# Patient Record
Sex: FEMALE | Race: WHITE | NOT HISPANIC OR LATINO | Employment: OTHER | URBAN - NONMETROPOLITAN AREA
[De-identification: names, ages, dates, MRNs, and addresses within clinical notes are randomized per-mention and may not be internally consistent; named-entity substitution may affect disease eponyms.]

---

## 2017-01-24 ENCOUNTER — OFFICE VISIT (OUTPATIENT)
Dept: NEUROSURGERY | Facility: CLINIC | Age: 52
End: 2017-01-24

## 2017-01-24 DIAGNOSIS — M51.36 L4-L5 DISC BULGE: Primary | ICD-10-CM

## 2017-01-24 DIAGNOSIS — M51.36 DDD (DEGENERATIVE DISC DISEASE), LUMBAR: ICD-10-CM

## 2017-01-24 PROCEDURE — 99203 OFFICE O/P NEW LOW 30 MIN: CPT | Performed by: NEUROLOGICAL SURGERY

## 2017-01-24 RX ORDER — ATORVASTATIN CALCIUM 20 MG/1
TABLET, FILM COATED ORAL
Refills: 2 | COMMUNITY
Start: 2016-12-22 | End: 2018-04-05

## 2017-01-24 RX ORDER — GABAPENTIN 100 MG/1
100 CAPSULE ORAL DAILY
COMMUNITY
End: 2018-04-05

## 2017-01-24 RX ORDER — CITALOPRAM 10 MG/1
TABLET ORAL
Refills: 11 | COMMUNITY
Start: 2016-12-18 | End: 2019-01-09 | Stop reason: ALTCHOICE

## 2017-01-24 RX ORDER — IBUPROFEN 200 MG
200 TABLET ORAL EVERY 6 HOURS PRN
COMMUNITY
End: 2017-02-09 | Stop reason: CLARIF

## 2017-01-24 NOTE — LETTER
January 24, 2017     LIAM Mtz  [address]  [place] [address]    Patient: Karin Sun   YOB: 1965   Date of Visit: 1/24/2017       Dear LIAM Pickett:    Thank you for referring Karin Sun to me for evaluation. Below is a copy of my consult note.    If you have questions, please do not hesitate to call me. I look forward to following Karin along with you.         Sincerely,        Addi Graham MD        CC: No Recipients    Subjective   Patient ID: Karin Sun is a 51 y.o. female is being seen for consultation today at the request of LIAM Mtz  Chief Complaint: Back and right leg pain pain    History of Present Illness: The patient is a 51-year-old woman with complaint of pain in her lower back for many years, and back and right leg discomfort for the past 2 years.  She gets numbness of the lateral 3 toes on her right foot if she sits too long, such as when driving, or when she walks for longer periods.  Standing or sitting have to be shifted in order to get relief.  Many years ago she had chiropractic therapy.  Her chiropractor first suggested fibromyalgia, and she has been treated in the past by rheumatology for this diagnosis.  She hasn't had any formal physical therapy done yet.  She is disabled by her chronic pain problems.  Review of Radiographic Studies:  [ Lumbar MRI scan on 12/30/16 was reviewed showing a central mild disc bulge at L4 5, all other findings are normal.  There is no evidence of a nerve root compression as the cause of the right hip and leg pain or the right lateral 3 toes numbness.]    The following portions of the patient's history were reviewed, updated as appropriate and approved: allergies, current medications, past family history, past medical history, past social history, past surgical history, review of systems and problem list.  Review of Systems   Constitutional: Positive for activity change. Negative for appetite change,  chills, diaphoresis, fatigue, fever and unexpected weight change.   HENT: Negative for congestion, dental problem, drooling, ear discharge, ear pain, facial swelling, hearing loss, mouth sores, nosebleeds, postnasal drip, rhinorrhea, sinus pressure, sneezing, sore throat, tinnitus, trouble swallowing and voice change.    Eyes: Positive for photophobia. Negative for pain, discharge, redness, itching and visual disturbance.   Respiratory: Negative for apnea, cough, choking, chest tightness, shortness of breath, wheezing and stridor.    Cardiovascular: Negative for chest pain, palpitations and leg swelling.   Gastrointestinal: Negative for abdominal distention, abdominal pain, anal bleeding, blood in stool, constipation, diarrhea, nausea, rectal pain and vomiting.   Endocrine: Negative for cold intolerance, heat intolerance, polydipsia, polyphagia and polyuria.   Genitourinary: Negative for decreased urine volume, difficulty urinating, dysuria, enuresis, flank pain, frequency, genital sores, hematuria and urgency.   Musculoskeletal: Positive for arthralgias, back pain, myalgias, neck pain and neck stiffness. Negative for gait problem and joint swelling.   Skin: Negative for color change, pallor, rash and wound.   Allergic/Immunologic: Positive for environmental allergies. Negative for food allergies and immunocompromised state.   Neurological: Positive for headaches. Negative for dizziness, tremors, seizures, syncope, facial asymmetry, speech difficulty, weakness, light-headedness and numbness.   Hematological: Negative for adenopathy. Does not bruise/bleed easily.   Psychiatric/Behavioral: Positive for decreased concentration. Negative for agitation, behavioral problems, confusion, dysphoric mood, hallucinations, self-injury, sleep disturbance and suicidal ideas. The patient is nervous/anxious. The patient is not hyperactive.    All other systems reviewed and are negative.      Objective     NEUROLOGICAL EXAMINATION:       MENTAL STATUS:  Alert and oriented.  Speech intact.  Recent and remote memory intact.      CRANIAL NERVES:  Cranial nerve II:  Visual fields are full to confrontation.  Cranial nerves III, IV and VI:  PERRLADC.  Extraocular movements are intact.  Nystagmus is not present.  Cranial nerve V:  Facial sensation is intact to light touch.  Cranial nerve VII:  Muscles of facial expression reveal no asymmetry.  Cranial nerve VIII:  Hearing is intact to finger rub bilaterally.  Cranial nerves IX and X:  Palate elevates symmetrically.  Cranial nerve XI:  Shoulder shrug is intact.  Cranial nerve XII:  Tongue is midline without evidence of atrophy or fasciculation.    MUSCULOSKELETAL:  SLR  mildly positive on the right, negative on the left. Francisco's test negative.    MOTOR:  Deltoid, biceps, triceps, and  strength intact.  No hand atrophy.  Hip flexion, knee extension, ankle dorsiflexion and plantar flexion intact. No tremor, spasticity, ataxia, or dysmetria.    SENSATION: Intact to touch upper and lower extremities.  Position sense intact.    REFLEXES:  DTR [itact and symmetrical in upper and lower extremities. Negative Babinski bilaterally    Assessment    Chronic back and right leg pain.  Mild disc bulge L4 5.  No evidence of lumbar stenosis or herniated disc or nerve root compression.       Plan    Physical therapy to try to assist with treatment of the chronic right hip pain by formal designated exercise management.       Addi Graahm MD

## 2017-01-24 NOTE — PROGRESS NOTES
Subjective   Patient ID: Karin Sun is a 51 y.o. female is being seen for consultation today at the request of LIAM Mtz  Chief Complaint: Back and right leg pain pain    History of Present Illness: The patient is a 51-year-old woman with complaint of pain in her lower back for many years, and back and right leg discomfort for the past 2 years.  She gets numbness of the lateral 3 toes on her right foot if she sits too long, such as when driving, or when she walks for longer periods.  Standing or sitting have to be shifted in order to get relief.  Many years ago she had chiropractic therapy.  Her chiropractor first suggested fibromyalgia, and she has been treated in the past by rheumatology for this diagnosis.  She hasn't had any formal physical therapy done yet.  She is disabled by her chronic pain problems.  Review of Radiographic Studies:  [ Lumbar MRI scan on 12/30/16 was reviewed showing a central mild disc bulge at L4 5, all other findings are normal.  There is no evidence of a nerve root compression as the cause of the right hip and leg pain or the right lateral 3 toes numbness.]    The following portions of the patient's history were reviewed, updated as appropriate and approved: allergies, current medications, past family history, past medical history, past social history, past surgical history, review of systems and problem list.  Review of Systems   Constitutional: Positive for activity change. Negative for appetite change, chills, diaphoresis, fatigue, fever and unexpected weight change.   HENT: Negative for congestion, dental problem, drooling, ear discharge, ear pain, facial swelling, hearing loss, mouth sores, nosebleeds, postnasal drip, rhinorrhea, sinus pressure, sneezing, sore throat, tinnitus, trouble swallowing and voice change.    Eyes: Positive for photophobia. Negative for pain, discharge, redness, itching and visual disturbance.   Respiratory: Negative for apnea, cough,  choking, chest tightness, shortness of breath, wheezing and stridor.    Cardiovascular: Negative for chest pain, palpitations and leg swelling.   Gastrointestinal: Negative for abdominal distention, abdominal pain, anal bleeding, blood in stool, constipation, diarrhea, nausea, rectal pain and vomiting.   Endocrine: Negative for cold intolerance, heat intolerance, polydipsia, polyphagia and polyuria.   Genitourinary: Negative for decreased urine volume, difficulty urinating, dysuria, enuresis, flank pain, frequency, genital sores, hematuria and urgency.   Musculoskeletal: Positive for arthralgias, back pain, myalgias, neck pain and neck stiffness. Negative for gait problem and joint swelling.   Skin: Negative for color change, pallor, rash and wound.   Allergic/Immunologic: Positive for environmental allergies. Negative for food allergies and immunocompromised state.   Neurological: Positive for headaches. Negative for dizziness, tremors, seizures, syncope, facial asymmetry, speech difficulty, weakness, light-headedness and numbness.   Hematological: Negative for adenopathy. Does not bruise/bleed easily.   Psychiatric/Behavioral: Positive for decreased concentration. Negative for agitation, behavioral problems, confusion, dysphoric mood, hallucinations, self-injury, sleep disturbance and suicidal ideas. The patient is nervous/anxious. The patient is not hyperactive.    All other systems reviewed and are negative.      Objective     NEUROLOGICAL EXAMINATION:      MENTAL STATUS:  Alert and oriented.  Speech intact.  Recent and remote memory intact.      CRANIAL NERVES:  Cranial nerve II:  Visual fields are full to confrontation.  Cranial nerves III, IV and VI:  PERRLADC.  Extraocular movements are intact.  Nystagmus is not present.  Cranial nerve V:  Facial sensation is intact to light touch.  Cranial nerve VII:  Muscles of facial expression reveal no asymmetry.  Cranial nerve VIII:  Hearing is intact to finger rub  bilaterally.  Cranial nerves IX and X:  Palate elevates symmetrically.  Cranial nerve XI:  Shoulder shrug is intact.  Cranial nerve XII:  Tongue is midline without evidence of atrophy or fasciculation.    MUSCULOSKELETAL:  SLR  mildly positive on the right, negative on the left. Francisco's test negative.    MOTOR:  Deltoid, biceps, triceps, and  strength intact.  No hand atrophy.  Hip flexion, knee extension, ankle dorsiflexion and plantar flexion intact. No tremor, spasticity, ataxia, or dysmetria.    SENSATION: Intact to touch upper and lower extremities.  Position sense intact.    REFLEXES:  DTR [itact and symmetrical in upper and lower extremities. Negative Babinski bilaterally    Assessment    Chronic back and right leg pain.  Mild disc bulge L4 5.  No evidence of lumbar stenosis or herniated disc or nerve root compression.       Plan    Physical therapy to try to assist with treatment of the chronic right hip pain by formal designated exercise management.       Addi Graham MD

## 2017-01-24 NOTE — MR AVS SNAPSHOT
Karin Sun   2017 10:00 AM   Office Visit    Dept Phone:  [phone]   Encounter #:  [id number]    Provider:  Addi Graham MD   Department:  [facility]                Your Full Care Plan              Your Updated Medication List          This list is accurate as of: 17 10:34 AM.  Always use your most recent med list.                atorvastatin 20 MG tablet   Commonly known as:  LIPITOR       citalopram 10 MG tablet   Commonly known as:  CeleXA       gabapentin 100 MG capsule   Commonly known as:  NEURONTIN       ibuprofen 200 MG tablet   Commonly known as:  ADVIL,MOTRIN               We Performed the Following     Ambulatory Referral to Physical Therapy Evaluate and treat, Neuro       You Were Diagnosed With        Codes Comments    L4-L5 disc bulge    -  Primary ICD-10-CM: M51.26  ICD-9-CM: 722.10     DDD (degenerative disc disease), lumbar     ICD-10-CM: M51.36  ICD-9-CM: 722.52       Instructions    Back Exercises  If you have pain in your back, do these exercises 2-3 times each day or as told by your doctor. When the pain goes away, do the exercises once each day, but repeat the steps more times for each exercise (do more repetitions). If you do not have pain in your back, do these exercises once each day or as told by your doctor.  EXERCISES  Single Knee to Chest  Do these steps 3-5 times in a row for each le. Lie on your back on a firm bed or the floor with your legs stretched out.  2. Bring one knee to your chest.  3. Hold your knee to your chest by grabbing your knee or thigh.  4. Pull on your knee until you feel a gentle stretch in your lower back.  5. Keep doing the stretch for 10-30 seconds.  6. Slowly let go of your leg and straighten it.  Pelvic Tilt  Do these steps 5-10 times in a row:  1. Lie on your back on a firm bed or the floor with your legs stretched out.  2. Bend your knees so they point up to the  ceiling. Your feet should be flat on the floor.  3. Tighten your lower belly (abdomen) muscles to press your lower back against the floor. This will make your tailbone point up to the ceiling instead of pointing down to your feet or the floor.  4. Stay in this position for 5-10 seconds while you gently tighten your muscles and breathe evenly.  Cat-Cow  Do these steps until your lower back bends more easily:  1. Get on your hands and knees on a firm surface. Keep your hands under your shoulders, and keep your knees under your hips. You may put padding under your knees.  2. Let your head hang down, and make your tailbone point down to the floor so your lower back is round like the back of a cat.  3. Stay in this position for 5 seconds.  4. Slowly lift your head and make your tailbone point up to the ceiling so your back hangs low (sags) like the back of a cow.  5. Stay in this position for 5 seconds.  Press-Ups  Do these steps 5-10 times in a row:  1. Lie on your belly (face-down) on the floor.  2. Place your hands near your head, about shoulder-width apart.  3. While you keep your back relaxed and keep your hips on the floor, slowly straighten your arms to raise the top half of your body and lift your shoulders. Do not use your back muscles. To make yourself more comfortable, you may change where you place your hands.  4. Stay in this position for 5 seconds.  5. Slowly return to lying flat on the floor.  Bridges  Do these steps 10 times in a row:  1. Lie on your back on a firm surface.  2. Bend your knees so they point up to the ceiling. Your feet should be flat on the floor.  3. Tighten your butt muscles and lift your butt off of the floor until your waist is almost as high as your knees. If you do not feel the muscles working in your butt and the back of your thighs, slide your feet 1-2 inches farther away from your butt.  4. Stay in this position for 3-5 seconds.  5. Slowly lower your butt to the floor, and let  your butt muscles relax.  If this exercise is too easy, try doing it with your arms crossed over your chest.  Belly Crunches  Do these steps 5-10 times in a row:  1. Lie on your back on a firm bed or the floor with your legs stretched out.  2. Bend your knees so they point up to the ceiling. Your feet should be flat on the floor.  3. Cross your arms over your chest.  4. Tip your chin a little bit toward your chest but do not bend your neck.  5. Tighten your belly muscles and slowly raise your chest just enough to lift your shoulder blades a tiny bit off of the floor.  6. Slowly lower your chest and your head to the floor.  Back Lifts  Do these steps 5-10 times in a row:  1. Lie on your belly (face-down) with your arms at your sides, and rest your forehead on the floor.  2. Tighten the muscles in your legs and your butt.  3. Slowly lift your chest off of the floor while you keep your hips on the floor. Keep the back of your head in line with the curve in your back. Look at the floor while you do this.  4. Stay in this position for 3-5 seconds.  5. Slowly lower your chest and your face to the floor.  GET HELP IF:  · Your back pain gets a lot worse when you do an exercise.  · Your back pain does not lessen 2 hours after you exercise.  If you have any of these problems, stop doing the exercises. Do not do them again unless your doctor says it is okay.  GET HELP RIGHT AWAY IF:  · You have sudden, very bad back pain. If this happens, stop doing the exercises. Do not do them again unless your doctor says it is okay.     This information is not intended to replace advice given to you by your health care provider. Make sure you discuss any questions you have with your health care provider.     Document Released: 01/20/2012 Document Revised: 09/07/2016 Document Reviewed: 02/11/2016  Elsevier Interactive Patient Education ©2016 [employer]  Degenerative Disk Disease  Degenerative disk disease is a condition caused by the  changes that occur in spinal disks as you grow older. Spinal disks are soft and compressible disks located between the bones of your spine (vertebrae). These disks act like shock absorbers. Degenerative disk disease can affect the whole spine. However, the neck and lower back are most commonly affected. Many changes can occur in the spinal disks with aging, such as:  · The spinal disks may dry and shrink.  · Small tears may occur in the tough, outer covering of the disk (annulus).  · The disk space may become smaller due to loss of water.  · Abnormal growths in the bone (spurs) may occur. This can put pressure on the nerve roots exiting the spinal canal, causing pain.  · The spinal canal may become narrowed.  RISK FACTORS   · Being overweight.  · Having a family history of degenerative disk disease.  · Smoking.  · There is increased risk if you are doing heavy lifting or have a sudden injury.  SIGNS AND SYMPTOMS   Symptoms vary from person to person and may include:  · Pain that varies in intensity. Some people have no pain, while others have severe pain. The location of the pain depends on the part of your backbone that is affected.  ¨ You will have neck or arm pain if a disk in the neck area is affected.  ¨ You will have pain in your back, buttocks, or legs if a disk in the lower back is affected.  · Pain that becomes worse while bending, reaching up, or with twisting movements.  · Pain that may start gradually and then get worse as time passes. It may also start after a major or minor injury.  · Numbness or tingling in the arms or legs.  DIAGNOSIS   Your health care provider will ask you about your symptoms and about activities or habits that may cause the pain. He or she may also ask about any injuries, diseases, or treatments you have had. Your health care provider will examine you to check for the range of movement that is possible in the affected area, to check for strength in your extremities, and to check  for sensation in the areas of the arms and legs supplied by different nerve roots. You may also have:   · An X-ray of the spine.  · Other imaging tests, such as MRI.  TREATMENT   Your health care provider will advise you on the best plan for treatment. Treatment may include:  · Medicines.  · Rehabilitation exercises.  HOME CARE INSTRUCTIONS   · Follow proper lifting and walking techniques as advised by your health care provider.  · Maintain good posture.  · Exercise regularly as advised by your health care provider.  · Perform relaxation exercises.  · Change your sitting, standing, and sleeping habits as advised by your health care provider.  · Change positions frequently.  · Lose weight or maintain a healthy weight as advised by your health care provider.  · Do not use any tobacco products, including cigarettes, chewing tobacco, or electronic cigarettes. If you need help quitting, ask your health care provider.  · Wear supportive footwear.  · Take medicines only as directed by your health care provider.  SEEK MEDICAL CARE IF:   · Your pain does not go away within 1-4 weeks.  · You have significant appetite or weight loss.  SEEK IMMEDIATE MEDICAL CARE IF:   · Your pain is severe.  · You notice weakness in your arms, hands, or legs.  · You begin to lose control of your bladder or bowel movements.  · You have fevers or night sweats.  MAKE SURE YOU:   · Understand these instructions.  · Will watch your condition.  · Will get help right away if you are not doing well or get worse.     This information is not intended to replace advice given to you by your health care provider. Make sure you discuss any questions you have with your health care provider.     Document Released: 10/14/2008 Document Revised: 01/08/2016 Document Reviewed: 04/21/2015  [employer] Interactive Patient Education ©2016 [employer]       Patient Instructions History      Upcoming Appointments     Visit Type Date Time Department    NEW PATIENT  2017 10:00 AM [facility]      [employer] Signup     [facility] [employer] allows you to send messages to your doctor, view your test results, renew your prescriptions, schedule appointments, and more. To sign up, go to [email or web address] and click on the Sign Up Now link in the New User? box. Enter your [employer] Activation Code exactly as it appears below along with the last four digits of your Social Security Number and your Date of Birth () to complete the sign-up process. If you do not sign up before the expiration date, you must request a new code.    [employer] Activation Code: [email or web address]  Expires: 2017 10:34 AM    If you have questions, you can email [email or web address] or call [phone] to talk to our [employer] staff. Remember, [employer] is NOT to be used for urgent needs. For medical emergencies, dial 911.               Other Info from Your Visit           Allergies     No Known Allergies      Reason for Visit     Back Pain LBP    Leg Pain Right     Numbness R LEG- FOOT     Tingling R LEG- FOOT       Vital Signs     Smoking Status                   Current Every Day Smoker           Problems and Diagnoses Noted     Degeneration of lumbar or lumbosacral intervertebral disc    L4-L5 disc bulge    -  Primary

## 2017-02-09 ENCOUNTER — OFFICE VISIT (OUTPATIENT)
Dept: ORTHOPEDIC SURGERY | Facility: CLINIC | Age: 52
End: 2017-02-09

## 2017-02-09 VITALS — HEIGHT: 63 IN | BODY MASS INDEX: 23.92 KG/M2 | RESPIRATION RATE: 17 BRPM | WEIGHT: 135 LBS

## 2017-02-09 DIAGNOSIS — M79.673 PAIN OF FOOT, UNSPECIFIED LATERALITY: Primary | ICD-10-CM

## 2017-02-09 DIAGNOSIS — G57.81 NEUROMA DIGITAL NERVE, RIGHT: ICD-10-CM

## 2017-02-09 PROCEDURE — 99204 OFFICE O/P NEW MOD 45 MIN: CPT | Performed by: PODIATRIST

## 2017-02-09 RX ORDER — MELOXICAM 7.5 MG/1
7.5 TABLET ORAL DAILY
Qty: 30 TABLET | Refills: 2 | Status: SHIPPED | OUTPATIENT
Start: 2017-02-09 | End: 2017-06-16 | Stop reason: SDUPTHER

## 2017-02-09 RX ORDER — CLARITHROMYCIN 500 MG/1
TABLET, COATED ORAL EVERY 12 HOURS
COMMUNITY
Start: 2015-04-09 | End: 2018-04-05

## 2017-02-09 RX ORDER — AMOXICILLIN 500 MG/1
CAPSULE ORAL
Refills: 0 | COMMUNITY
Start: 2016-12-18 | End: 2018-04-05

## 2017-02-09 RX ORDER — GUAIFENESIN 600 MG/1
TABLET, EXTENDED RELEASE ORAL EVERY 12 HOURS
COMMUNITY
Start: 2015-04-09 | End: 2019-01-09 | Stop reason: ALTCHOICE

## 2017-02-09 RX ORDER — VARENICLINE TARTRATE 1 MG/1
TABLET, FILM COATED ORAL 2 TIMES DAILY
COMMUNITY
Start: 2014-09-25 | End: 2018-04-05

## 2017-02-09 RX ORDER — IBUPROFEN 200 MG
TABLET ORAL 3 TIMES DAILY
COMMUNITY
Start: 2014-09-25

## 2017-02-09 RX ORDER — DULOXETIN HYDROCHLORIDE 30 MG/1
CAPSULE, DELAYED RELEASE ORAL DAILY
COMMUNITY
Start: 2014-09-25 | End: 2018-04-05

## 2017-02-09 RX ORDER — ACETAMINOPHEN WITH CODEINE 300MG-15MG
TABLET ORAL 4 TIMES DAILY
COMMUNITY
Start: 2014-05-30 | End: 2018-04-05

## 2017-02-09 RX ORDER — DIAZEPAM 10 MG/1
TABLET ORAL
COMMUNITY
Start: 2014-05-30 | End: 2018-04-05

## 2017-02-09 RX ORDER — FLUTICASONE PROPIONATE 50 MCG
SPRAY, SUSPENSION (ML) NASAL DAILY
COMMUNITY
Start: 2015-04-09 | End: 2018-04-05

## 2017-02-09 RX ORDER — MELOXICAM 7.5 MG/1
7.5 TABLET ORAL DAILY
Qty: 30 TABLET | Refills: 2 | Status: SHIPPED | OUTPATIENT
Start: 2017-02-09 | End: 2017-02-09 | Stop reason: SDUPTHER

## 2017-02-09 RX ORDER — ALENDRONATE SODIUM 70 MG/1
TABLET ORAL DAILY
COMMUNITY
Start: 2014-12-01 | End: 2018-04-05

## 2017-02-09 NOTE — PROGRESS NOTES
Subjective   Patient ID: Karin Sun is a 51 y.o. female   Pain of the Right Foot   complains of a numbness in her right foot second third and fourth toes.  She relates that she does have a significant past medical history related to her back.  She recently had an MRI of her back.  She says she's seen Dr. palomo in the past who states that while she did have issues with her lower back of this was nothing surgical at the time.  She also relates she has fibromyalgia.  She complains of a pain with and without a variation of different shoes.  She states when she walks on it hurts and they go numb with her when she gets off of it it's better.  She complains of a numbness burning tingling from her lower back and buttock down her leg.    History of Present Illness    Review of Systems   Constitutional: Negative for diaphoresis, fever and unexpected weight change.   HENT: Negative for dental problem and sore throat.    Eyes: Negative for visual disturbance.   Respiratory: Negative for shortness of breath.    Cardiovascular: Negative for chest pain.   Gastrointestinal: Negative for abdominal pain, constipation, diarrhea, nausea and vomiting.   Genitourinary: Negative for difficulty urinating and frequency.   Musculoskeletal: Positive for arthralgias.   Neurological: Negative for headaches.   Hematological: Does not bruise/bleed easily.   All other systems reviewed and are negative.      Past Medical History   Diagnosis Date   • Anemia    • Arthritis    • Headache    • Low back pain    • Osteoarthritis         Past Surgical History   Procedure Laterality Date   • Elbow fusion  1995   • Hysterectomy         Allergies   Allergen Reactions   • No Known Drug Allergy        Objective   Physical Exam   Constitutional: She is oriented to person, place, and time. She appears well-developed and well-nourished.   HENT:   Head: Normocephalic and atraumatic.   Eyes: EOM are normal. Pupils are equal, round, and reactive to light.    Neck: Normal range of motion.   Cardiovascular: Normal rate.    Pulmonary/Chest: Effort normal.   Abdominal: Soft. Bowel sounds are normal.   Musculoskeletal: Normal range of motion.   Neurological: She is alert and oriented to person, place, and time. She has normal reflexes.   Skin: Skin is warm.   Psychiatric: She has a normal mood and affect. Her behavior is normal. Judgment and thought content normal.     Ortho Exam  Ortho Exam  Right Lower extremity exam:  Vascular: He wears noted, pedal pulses are palpable, capillary refill time is brisk, no edema is noted  Neuro: Gross sensations intact, reflexes are normal she complains of numbness and paresthesias in the second third and fourth toes on the right foot  Derm: No abnormalities  MSK: He is tender between the second and third as well as third and fourth metatarsal heads.  There is a positive Adriel's click.  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    Karin was seen today for pain.    Diagnoses and all orders for this visit:    Pain of foot, unspecified laterality  -     XR Foot 3+ View Right    Neuroma digital nerve, right    Other orders  -     meloxicam (MOBIC) 7.5 MG tablet; Take 1 tablet by mouth Daily.          Recommendations/Plan:  Tried explaining to her that this is most likely a combination of multiple factors.  I explained her that while she may very well have a second and third interspace neuroma being that she has overlying fibromyalgia and lumbar spine issues it may be difficult to completely alleviate all her pain and symptoms.  Also explained her that  these can overlie one another or mask one another.  I do think she most likely has legitimate foot pathology but she also has other issues that I think her explaining her other various symptoms, sensations, feelings.  As far as the foot pathology goes I recommend she try metatarsal pad.  I recommend she wear good supportive padded shoes.  I'm going to have her stop her ibuprofen and have her take once a day meloxicam.  Most of in the center and Rx for a topical compounded cream and try that.  I explained her that these don't help we may want to consider corticosteroid injection.  I explained her that if those don't work tonight last options are alcohol sclerosing injections or excision.  I'll see her back in about 3 weeks.  In regards to her back I recommend she see Dr. palomo and may be discussed with him injections or also did explained her that Dr. Snell is in the area.    Return in about 3 weeks (around 3/2/2017).  Patient agreeable to call or return sooner for any concerns.

## 2017-03-02 ENCOUNTER — OFFICE VISIT (OUTPATIENT)
Dept: ORTHOPEDIC SURGERY | Facility: CLINIC | Age: 52
End: 2017-03-02

## 2017-03-02 VITALS — RESPIRATION RATE: 18 BRPM | HEIGHT: 63 IN | WEIGHT: 131 LBS | BODY MASS INDEX: 23.21 KG/M2

## 2017-03-02 DIAGNOSIS — G57.81 NEUROMA DIGITAL NERVE, RIGHT: Primary | ICD-10-CM

## 2017-03-02 DIAGNOSIS — M79.673 PAIN OF FOOT, UNSPECIFIED LATERALITY: ICD-10-CM

## 2017-03-02 PROCEDURE — 99213 OFFICE O/P EST LOW 20 MIN: CPT | Performed by: PODIATRIST

## 2017-03-02 NOTE — PROGRESS NOTES
Subjective   Patient ID: Karin Sun is a 51 y.o. female   Pain and Follow-up of the Right Foot   she states she cannot get the cream because her insurance would not pay for it.  She states her sister does have some type of pain cream and she wondered if she could try.  She states she doesn't really tell much difference with the medication I gave her because she's been on everything for so long.  She still complains of when she wears a shoe and drives due to the pressure on the bottom of her right foot her foot goes numb very quickly and SHE HAS tO take the shoe off.    History of Present Illness    Review of Systems   Constitutional: Negative for fever.   HENT: Negative for voice change.    Eyes: Negative for visual disturbance.   Respiratory: Negative for shortness of breath.    Cardiovascular: Negative for chest pain.   Gastrointestinal: Negative for abdominal distention and abdominal pain.   Genitourinary: Negative for dysuria.   Musculoskeletal: Positive for arthralgias. Negative for gait problem and joint swelling.   Skin: Negative for rash.   Neurological: Negative for speech difficulty.   Hematological: Does not bruise/bleed easily.   Psychiatric/Behavioral: Negative for confusion.       Past Medical History   Diagnosis Date   • Anemia    • Arthritis    • Headache    • Low back pain    • Osteoarthritis         Past Surgical History   Procedure Laterality Date   • Elbow fusion  1995   • Hysterectomy         Allergies   Allergen Reactions   • No Known Drug Allergy        @/ reviewed@    Objective   Physical Exam   Constitutional: She is oriented to person, place, and time. She appears well-developed and well-nourished.   HENT:   Head: Normocephalic and atraumatic.   Eyes: EOM are normal. Pupils are equal, round, and reactive to light.   Neck: Normal range of motion.   Cardiovascular: Normal rate.    Pulmonary/Chest: Effort normal.   Abdominal: Soft. Bowel sounds are normal.   Musculoskeletal: Normal  range of motion.   Neurological: She is alert and oriented to person, place, and time. She has normal reflexes.   Skin: Skin is warm.   Psychiatric: She has a normal mood and affect. Her behavior is normal. Judgment and thought content normal.     Ortho Exam  Ortho Exam  RIGHT Lower extremity exam:  Vascular:He wears noted, pedal pulses are palpable, capillary refill time is brisk, no edema is noted  Neuro: Gross sensations intact, reflexes are normal she complains of numbness and paresthesias in the second third and fourth toes on the right foot  Derm: No abnormalities  MSK: He is tender between the second and third as well as third and fourth metatarsal heads. There is a positive Adriel's click. 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    Karin was seen today for pain and follow-up.    Diagnoses and all orders for this visit:    Neuroma digital nerve, right    Pain of foot, unspecified laterality    Other orders  -     Cancel: Injection Tendon or Ligament        Recommendations/Plan:  I offered her steroid injection today and after a long discussion she declined.  She states she doesn't want to have a shot in her foot today.  I explained her that aside from injections and previous treatment tell anything I can offer her is excision of the neuromas.  We again discussed that she most likely has a combination of neuroma as well as lumbar radiculopathy/neuropathy and she also has overlying fibromyalgia.  She states she wants to see Dr. Rice about her back and I explained her that Dr. Easton he does not typically treat backs  and he would most likely seen her to Dr. Graham or [place].  She seen Dr. Graham in the past and states she wasn't crazy about him so she wanted Dr. Rice's opinion.  I explained her she could try her sister's a topical pain cream for a couple weeks and see if she notices any difference and at that point I wouldn't have anything else again offer her other than injection.  I will leave it up to her at that point if she wished come back in 3 weeks we can try an injection aside from that I would have to send her to Dr. Buitrago for orthotics but due to her insurance she would have to pay for custom orthotics out-of-pocket.    No Follow-up on file.  Patient agreeable to call or return sooner for any concerns.

## 2017-06-16 RX ORDER — MELOXICAM 7.5 MG/1
TABLET ORAL
Qty: 30 TABLET | Refills: 1 | Status: SHIPPED | OUTPATIENT
Start: 2017-06-16 | End: 2018-04-05

## 2017-07-17 RX ORDER — MELOXICAM 7.5 MG/1
TABLET ORAL
Qty: 30 TABLET | Refills: 1 | Status: SHIPPED | OUTPATIENT
Start: 2017-07-17 | End: 2018-04-05

## 2017-08-15 RX ORDER — MELOXICAM 7.5 MG/1
TABLET ORAL
Qty: 30 TABLET | Refills: 1 | Status: SHIPPED | OUTPATIENT
Start: 2017-08-15 | End: 2018-04-05

## 2019-01-09 ENCOUNTER — OFFICE VISIT (OUTPATIENT)
Dept: NEUROSURGERY | Facility: CLINIC | Age: 54
End: 2019-01-09

## 2019-01-09 VITALS
TEMPERATURE: 98.9 F | DIASTOLIC BLOOD PRESSURE: 74 MMHG | HEIGHT: 63 IN | WEIGHT: 129.2 LBS | SYSTOLIC BLOOD PRESSURE: 128 MMHG | BODY MASS INDEX: 22.89 KG/M2

## 2019-01-09 DIAGNOSIS — M79.7 FIBROMYALGIA: ICD-10-CM

## 2019-01-09 DIAGNOSIS — M25.511 CHRONIC RIGHT SHOULDER PAIN: ICD-10-CM

## 2019-01-09 DIAGNOSIS — M79.601 RIGHT ARM PAIN: ICD-10-CM

## 2019-01-09 DIAGNOSIS — G89.29 CHRONIC RIGHT SHOULDER PAIN: ICD-10-CM

## 2019-01-09 DIAGNOSIS — M54.2 NECK PAIN: Primary | ICD-10-CM

## 2019-01-09 PROCEDURE — 99244 OFF/OP CNSLTJ NEW/EST MOD 40: CPT | Performed by: NEUROLOGICAL SURGERY

## 2019-01-09 RX ORDER — TRAMADOL HYDROCHLORIDE 50 MG/1
TABLET ORAL
Refills: 1 | COMMUNITY
Start: 2018-12-17

## 2019-01-09 RX ORDER — PRAMIPEXOLE DIHYDROCHLORIDE 0.12 MG/1
0.12 TABLET ORAL
COMMUNITY

## 2019-01-09 RX ORDER — GABAPENTIN 100 MG/1
100 CAPSULE ORAL
COMMUNITY

## 2019-01-09 RX ORDER — IBUPROFEN 600 MG/1
600 TABLET ORAL
COMMUNITY
End: 2019-03-04

## 2019-01-09 RX ORDER — SIMVASTATIN 20 MG
20 TABLET ORAL
COMMUNITY
Start: 2018-02-06 | End: 2019-03-04

## 2019-01-09 RX ORDER — HYDROCODONE BITARTRATE AND ACETAMINOPHEN 5; 325 MG/1; MG/1
TABLET ORAL
COMMUNITY
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
## 2019-02-05 DIAGNOSIS — M25.511 ARTHRALGIA OF SHOULDER REGION, RIGHT: Primary | ICD-10-CM

## 2019-02-06 ENCOUNTER — OFFICE VISIT (OUTPATIENT)
Dept: ORTHOPEDIC SURGERY | Facility: CLINIC | Age: 54
End: 2019-02-06

## 2019-02-06 VITALS — BODY MASS INDEX: 22.86 KG/M2 | WEIGHT: 129 LBS | HEIGHT: 63 IN | RESPIRATION RATE: 18 BRPM

## 2019-02-06 DIAGNOSIS — M54.10 BRACHIAL NEURITIS OF RIGHT UPPER EXTREMITY: ICD-10-CM

## 2019-02-06 DIAGNOSIS — M25.511 ARTHRALGIA OF SHOULDER REGION, RIGHT: Primary | ICD-10-CM

## 2019-02-06 DIAGNOSIS — M54.2 CERVICALGIA: ICD-10-CM

## 2019-02-06 PROCEDURE — 73030 X-RAY EXAM OF SHOULDER: CPT | Performed by: ORTHOPAEDIC SURGERY

## 2019-02-06 PROCEDURE — 99214 OFFICE O/P EST MOD 30 MIN: CPT | Performed by: ORTHOPAEDIC SURGERY

## 2019-02-06 RX ORDER — METHYLPREDNISOLONE 4 MG/1
TABLET ORAL
Qty: 21 TABLET | Refills: 0 | Status: SHIPPED | OUTPATIENT
Start: 2019-02-06 | End: 2019-03-04
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
## 2019-02-11 ENCOUNTER — HOSPITAL ENCOUNTER (OUTPATIENT)
Dept: MRI IMAGING | Facility: HOSPITAL | Age: 54
Discharge: HOME | End: 2019-02-11
Admitting: ORTHOPAEDIC SURGERY

## 2019-02-11 PROCEDURE — 73221 MRI JOINT UPR EXTREM W/O DYE: CPT

## 2019-02-13 ENCOUNTER — PROCEDURE VISIT (OUTPATIENT)
Dept: ORTHOPEDIC SURGERY | Facility: CLINIC | Age: 54
End: 2019-02-13

## 2019-02-13 DIAGNOSIS — M54.12 BRACHIAL NEURITIS: Primary | ICD-10-CM

## 2019-02-13 DIAGNOSIS — R20.2 PARESTHESIA: ICD-10-CM

## 2019-02-13 DIAGNOSIS — M79.601 RIGHT UPPER LIMB PAIN: ICD-10-CM

## 2019-02-13 PROCEDURE — 95886 MUSC TEST DONE W/N TEST COMP: CPT | Performed by: PHYSICAL THERAPIST

## 2019-02-13 PROCEDURE — 95910 NRV CNDJ TEST 7-8 STUDIES: CPT | Performed by: PHYSICAL THERAPIST

## 2019-03-04 ENCOUNTER — OFFICE VISIT (OUTPATIENT)
Dept: ORTHOPEDIC SURGERY | Facility: CLINIC | Age: 54
End: 2019-03-04

## 2019-03-04 VITALS — RESPIRATION RATE: 18 BRPM | WEIGHT: 129 LBS | BODY MASS INDEX: 22.86 KG/M2 | HEIGHT: 63 IN

## 2019-03-04 DIAGNOSIS — R20.2 PARESTHESIA: ICD-10-CM

## 2019-03-04 DIAGNOSIS — M25.511 ARTHRALGIA OF SHOULDER REGION, RIGHT: Primary | ICD-10-CM

## 2019-03-04 DIAGNOSIS — M50.122 CERVICAL DISC DISORDER AT C5-C6 LEVEL WITH RADICULOPATHY: ICD-10-CM

## 2019-03-04 DIAGNOSIS — S46.811D INFRASPINATUS STRAIN, RIGHT, SUBSEQUENT ENCOUNTER: ICD-10-CM

## 2019-03-04 DIAGNOSIS — M54.2 CERVICALGIA: ICD-10-CM

## 2019-03-04 PROCEDURE — 99213 OFFICE O/P EST LOW 20 MIN: CPT | Performed by: ORTHOPAEDIC SURGERY

## 2019-03-04 RX ORDER — CYCLOBENZAPRINE HCL 10 MG
10 TABLET ORAL 2 TIMES DAILY PRN
Qty: 28 TABLET | Refills: 0 | Status: SHIPPED | OUTPATIENT
Start: 2019-03-04
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
